# Patient Record
Sex: MALE | Race: WHITE | NOT HISPANIC OR LATINO | Employment: OTHER | URBAN - METROPOLITAN AREA
[De-identification: names, ages, dates, MRNs, and addresses within clinical notes are randomized per-mention and may not be internally consistent; named-entity substitution may affect disease eponyms.]

---

## 2022-11-23 ENCOUNTER — TRANSFERRED RECORDS (OUTPATIENT)
Dept: HEALTH INFORMATION MANAGEMENT | Facility: CLINIC | Age: 82
End: 2022-11-23

## 2023-01-18 ENCOUNTER — MEDICAL CORRESPONDENCE (OUTPATIENT)
Dept: HEALTH INFORMATION MANAGEMENT | Facility: CLINIC | Age: 83
End: 2023-01-18

## 2023-07-25 ENCOUNTER — HOSPITAL ENCOUNTER (OUTPATIENT)
Facility: CLINIC | Age: 83
End: 2023-07-25
Attending: ORTHOPAEDIC SURGERY | Admitting: ORTHOPAEDIC SURGERY
Payer: MEDICARE

## 2023-08-22 NOTE — PROGRESS NOTES
Pre-op Total Joint Patient Screening    Do you have a ride available to come to the hospital the day after your surgery by 8am with anticipated discharge of 11am? Y  What is the name of this person? Cayla(spouse)  Do you have a  set up after surgery? Y  Will your  be the same person that gives you a ride home after surgery? Y  Have you received the Joint Replacement Guidebook? Y  Do you have any questions about your guidebook? N  Have you signed up for Epic Care Companion? N  Have you signed up for MY Chart access? N

## 2023-08-23 RX ORDER — ACETAMINOPHEN 500 MG
1000 TABLET ORAL EVERY 8 HOURS PRN
Status: ON HOLD | COMMUNITY
Start: 2023-02-08 | End: 2023-08-26

## 2023-08-23 RX ORDER — TAMSULOSIN HYDROCHLORIDE 0.4 MG/1
0.4 CAPSULE ORAL DAILY
COMMUNITY

## 2023-08-23 RX ORDER — ATORVASTATIN CALCIUM 20 MG/1
20 TABLET, FILM COATED ORAL DAILY
COMMUNITY

## 2023-08-23 RX ORDER — ACYCLOVIR 400 MG/1
400 TABLET ORAL 2 TIMES DAILY PRN
COMMUNITY
Start: 2022-10-20

## 2023-08-24 RX ORDER — PREGABALIN 150 MG/1
1 CAPSULE ORAL 2 TIMES DAILY
COMMUNITY

## 2023-08-24 RX ORDER — ACETAZOLAMIDE 125 MG/1
1 TABLET ORAL 2 TIMES DAILY PRN
COMMUNITY

## 2023-08-25 ENCOUNTER — HOSPITAL ENCOUNTER (OUTPATIENT)
Facility: CLINIC | Age: 83
Discharge: HOME | End: 2023-08-26
Attending: ORTHOPAEDIC SURGERY | Admitting: ORTHOPAEDIC SURGERY
Payer: MEDICARE

## 2023-08-25 ENCOUNTER — APPOINTMENT (OUTPATIENT)
Dept: PHYSICAL THERAPY | Facility: CLINIC | Age: 83
End: 2023-08-25
Attending: ORTHOPAEDIC SURGERY
Payer: MEDICARE

## 2023-08-25 ENCOUNTER — APPOINTMENT (OUTPATIENT)
Dept: GENERAL RADIOLOGY | Facility: CLINIC | Age: 83
End: 2023-08-25
Payer: MEDICARE

## 2023-08-25 ENCOUNTER — ANESTHESIA EVENT (OUTPATIENT)
Dept: SURGERY | Facility: CLINIC | Age: 83
End: 2023-08-25
Payer: MEDICARE

## 2023-08-25 ENCOUNTER — ANESTHESIA (OUTPATIENT)
Dept: SURGERY | Facility: CLINIC | Age: 83
End: 2023-08-25
Payer: MEDICARE

## 2023-08-25 DIAGNOSIS — Z96.651 STATUS POST TOTAL RIGHT KNEE REPLACEMENT: Primary | ICD-10-CM

## 2023-08-25 LAB
CREAT SERPL-MCNC: 0.85 MG/DL (ref 0.67–1.17)
FASTING STATUS PATIENT QL REPORTED: YES
GFR SERPL CREATININE-BSD FRML MDRD: 86 ML/MIN/1.73M2
GLUCOSE SERPL-MCNC: 105 MG/DL (ref 70–99)
HGB BLD-MCNC: 12.7 G/DL (ref 13.3–17.7)
POTASSIUM SERPL-SCNC: 4.3 MMOL/L (ref 3.4–5.3)

## 2023-08-25 PROCEDURE — 360N000077 HC SURGERY LEVEL 4, PER MIN: Performed by: ORTHOPAEDIC SURGERY

## 2023-08-25 PROCEDURE — C1776 JOINT DEVICE (IMPLANTABLE): HCPCS | Performed by: ORTHOPAEDIC SURGERY

## 2023-08-25 PROCEDURE — 250N000011 HC RX IP 250 OP 636

## 2023-08-25 PROCEDURE — 710N000009 HC RECOVERY PHASE 1, LEVEL 1, PER MIN: Performed by: ORTHOPAEDIC SURGERY

## 2023-08-25 PROCEDURE — 999N000141 HC STATISTIC PRE-PROCEDURE NURSING ASSESSMENT: Performed by: ORTHOPAEDIC SURGERY

## 2023-08-25 PROCEDURE — 250N000011 HC RX IP 250 OP 636: Performed by: ANESTHESIOLOGY

## 2023-08-25 PROCEDURE — 258N000003 HC RX IP 258 OP 636: Performed by: NURSE ANESTHETIST, CERTIFIED REGISTERED

## 2023-08-25 PROCEDURE — 370N000017 HC ANESTHESIA TECHNICAL FEE, PER MIN: Performed by: ORTHOPAEDIC SURGERY

## 2023-08-25 PROCEDURE — 85018 HEMOGLOBIN: CPT

## 2023-08-25 PROCEDURE — 82565 ASSAY OF CREATININE: CPT

## 2023-08-25 PROCEDURE — 36415 COLL VENOUS BLD VENIPUNCTURE: CPT

## 2023-08-25 PROCEDURE — 250N000009 HC RX 250: Performed by: ORTHOPAEDIC SURGERY

## 2023-08-25 PROCEDURE — 999N000063 XR KNEE PORT RIGHT 1/2 VIEWS: Mod: RT

## 2023-08-25 PROCEDURE — 250N000011 HC RX IP 250 OP 636: Performed by: ORTHOPAEDIC SURGERY

## 2023-08-25 PROCEDURE — 99207 PR NO CHARGE LOS: CPT | Performed by: INTERNAL MEDICINE

## 2023-08-25 PROCEDURE — 258N000003 HC RX IP 258 OP 636: Performed by: STUDENT IN AN ORGANIZED HEALTH CARE EDUCATION/TRAINING PROGRAM

## 2023-08-25 PROCEDURE — C1713 ANCHOR/SCREW BN/BN,TIS/BN: HCPCS | Performed by: ORTHOPAEDIC SURGERY

## 2023-08-25 PROCEDURE — 272N000001 HC OR GENERAL SUPPLY STERILE: Performed by: ORTHOPAEDIC SURGERY

## 2023-08-25 PROCEDURE — 258N000003 HC RX IP 258 OP 636

## 2023-08-25 PROCEDURE — 258N000001 HC RX 258: Performed by: ORTHOPAEDIC SURGERY

## 2023-08-25 PROCEDURE — 250N000013 HC RX MED GY IP 250 OP 250 PS 637: Performed by: INTERNAL MEDICINE

## 2023-08-25 PROCEDURE — 250N000009 HC RX 250: Performed by: NURSE ANESTHETIST, CERTIFIED REGISTERED

## 2023-08-25 PROCEDURE — 82947 ASSAY GLUCOSE BLOOD QUANT: CPT | Performed by: STUDENT IN AN ORGANIZED HEALTH CARE EDUCATION/TRAINING PROGRAM

## 2023-08-25 PROCEDURE — 84132 ASSAY OF SERUM POTASSIUM: CPT | Performed by: STUDENT IN AN ORGANIZED HEALTH CARE EDUCATION/TRAINING PROGRAM

## 2023-08-25 PROCEDURE — 250N000013 HC RX MED GY IP 250 OP 250 PS 637

## 2023-08-25 PROCEDURE — 250N000011 HC RX IP 250 OP 636: Mod: JZ | Performed by: NURSE ANESTHETIST, CERTIFIED REGISTERED

## 2023-08-25 PROCEDURE — 97116 GAIT TRAINING THERAPY: CPT | Mod: GP

## 2023-08-25 PROCEDURE — 97161 PT EVAL LOW COMPLEX 20 MIN: CPT | Mod: GP

## 2023-08-25 PROCEDURE — 97110 THERAPEUTIC EXERCISES: CPT | Mod: GP

## 2023-08-25 PROCEDURE — 250N000009 HC RX 250: Performed by: ANESTHESIOLOGY

## 2023-08-25 DEVICE — UNIVERSAL TIBIAL BASEPLATE
Type: IMPLANTABLE DEVICE | Site: KNEE | Status: FUNCTIONAL
Brand: TRIATHLON

## 2023-08-25 DEVICE — CRUCIATE RETAINING FEMORAL
Type: IMPLANTABLE DEVICE | Site: KNEE | Status: FUNCTIONAL
Brand: TRIATHLON

## 2023-08-25 DEVICE — FULL DOSE BONE CEMENT, 10 PACK CATALOG NUMBER IS 6191-1-010
Type: IMPLANTABLE DEVICE | Site: KNEE | Status: FUNCTIONAL
Brand: SIMPLEX

## 2023-08-25 DEVICE — TIBIAL BEARING INSERT - CS
Type: IMPLANTABLE DEVICE | Site: KNEE | Status: FUNCTIONAL
Brand: TRIATHLON

## 2023-08-25 RX ORDER — NALOXONE HYDROCHLORIDE 0.4 MG/ML
0.2 INJECTION, SOLUTION INTRAMUSCULAR; INTRAVENOUS; SUBCUTANEOUS
Status: DISCONTINUED | OUTPATIENT
Start: 2023-08-25 | End: 2023-08-26 | Stop reason: HOSPADM

## 2023-08-25 RX ORDER — POLYETHYLENE GLYCOL 3350 17 G/17G
1 POWDER, FOR SOLUTION ORAL DAILY
Qty: 7 PACKET | Refills: 0 | Status: SHIPPED | OUTPATIENT
Start: 2023-08-25

## 2023-08-25 RX ORDER — OXYCODONE HYDROCHLORIDE 5 MG/1
10 TABLET ORAL EVERY 4 HOURS PRN
Status: DISCONTINUED | OUTPATIENT
Start: 2023-08-25 | End: 2023-08-26 | Stop reason: HOSPADM

## 2023-08-25 RX ORDER — FENTANYL CITRATE 0.05 MG/ML
25 INJECTION, SOLUTION INTRAMUSCULAR; INTRAVENOUS EVERY 5 MIN PRN
Status: DISCONTINUED | OUTPATIENT
Start: 2023-08-25 | End: 2023-08-25 | Stop reason: HOSPADM

## 2023-08-25 RX ORDER — LIDOCAINE HYDROCHLORIDE 20 MG/ML
INJECTION, SOLUTION INFILTRATION; PERINEURAL PRN
Status: DISCONTINUED | OUTPATIENT
Start: 2023-08-25 | End: 2023-08-25

## 2023-08-25 RX ORDER — PROCHLORPERAZINE MALEATE 5 MG
5 TABLET ORAL EVERY 6 HOURS PRN
Status: DISCONTINUED | OUTPATIENT
Start: 2023-08-25 | End: 2023-08-26 | Stop reason: HOSPADM

## 2023-08-25 RX ORDER — HYDROMORPHONE HCL IN WATER/PF 6 MG/30 ML
0.2 PATIENT CONTROLLED ANALGESIA SYRINGE INTRAVENOUS
Status: DISCONTINUED | OUTPATIENT
Start: 2023-08-25 | End: 2023-08-26 | Stop reason: HOSPADM

## 2023-08-25 RX ORDER — CEFAZOLIN SODIUM/WATER 2 G/20 ML
2 SYRINGE (ML) INTRAVENOUS SEE ADMIN INSTRUCTIONS
Status: DISCONTINUED | OUTPATIENT
Start: 2023-08-25 | End: 2023-08-25 | Stop reason: HOSPADM

## 2023-08-25 RX ORDER — AMOXICILLIN 250 MG
1-2 CAPSULE ORAL 2 TIMES DAILY
Qty: 30 TABLET | Refills: 0 | Status: SHIPPED | OUTPATIENT
Start: 2023-08-25

## 2023-08-25 RX ORDER — POLYETHYLENE GLYCOL 3350 17 G/17G
17 POWDER, FOR SOLUTION ORAL DAILY
Status: DISCONTINUED | OUTPATIENT
Start: 2023-08-26 | End: 2023-08-26 | Stop reason: HOSPADM

## 2023-08-25 RX ORDER — MAGNESIUM HYDROXIDE 1200 MG/15ML
LIQUID ORAL PRN
Status: DISCONTINUED | OUTPATIENT
Start: 2023-08-25 | End: 2023-08-25 | Stop reason: HOSPADM

## 2023-08-25 RX ORDER — TAMSULOSIN HYDROCHLORIDE 0.4 MG/1
0.4 CAPSULE ORAL DAILY
Status: DISCONTINUED | OUTPATIENT
Start: 2023-08-25 | End: 2023-08-26 | Stop reason: HOSPADM

## 2023-08-25 RX ORDER — ONDANSETRON 2 MG/ML
4 INJECTION INTRAMUSCULAR; INTRAVENOUS EVERY 30 MIN PRN
Status: DISCONTINUED | OUTPATIENT
Start: 2023-08-25 | End: 2023-08-25 | Stop reason: HOSPADM

## 2023-08-25 RX ORDER — NALOXONE HYDROCHLORIDE 0.4 MG/ML
0.4 INJECTION, SOLUTION INTRAMUSCULAR; INTRAVENOUS; SUBCUTANEOUS
Status: DISCONTINUED | OUTPATIENT
Start: 2023-08-25 | End: 2023-08-26 | Stop reason: HOSPADM

## 2023-08-25 RX ORDER — ONDANSETRON 4 MG/1
4 TABLET, ORALLY DISINTEGRATING ORAL EVERY 30 MIN PRN
Status: DISCONTINUED | OUTPATIENT
Start: 2023-08-25 | End: 2023-08-25 | Stop reason: HOSPADM

## 2023-08-25 RX ORDER — TRANEXAMIC ACID 650 MG/1
1950 TABLET ORAL ONCE
Status: COMPLETED | OUTPATIENT
Start: 2023-08-25 | End: 2023-08-25

## 2023-08-25 RX ORDER — LABETALOL HYDROCHLORIDE 5 MG/ML
10 INJECTION, SOLUTION INTRAVENOUS
Status: DISCONTINUED | OUTPATIENT
Start: 2023-08-25 | End: 2023-08-25 | Stop reason: HOSPADM

## 2023-08-25 RX ORDER — LIDOCAINE 40 MG/G
CREAM TOPICAL
Status: DISCONTINUED | OUTPATIENT
Start: 2023-08-25 | End: 2023-08-26 | Stop reason: HOSPADM

## 2023-08-25 RX ORDER — SODIUM CHLORIDE, SODIUM LACTATE, POTASSIUM CHLORIDE, CALCIUM CHLORIDE 600; 310; 30; 20 MG/100ML; MG/100ML; MG/100ML; MG/100ML
INJECTION, SOLUTION INTRAVENOUS CONTINUOUS PRN
Status: DISCONTINUED | OUTPATIENT
Start: 2023-08-25 | End: 2023-08-25

## 2023-08-25 RX ORDER — ACETAMINOPHEN 325 MG/1
975 TABLET ORAL EVERY 8 HOURS
Status: DISCONTINUED | OUTPATIENT
Start: 2023-08-25 | End: 2023-08-26 | Stop reason: HOSPADM

## 2023-08-25 RX ORDER — AMOXICILLIN 250 MG
1 CAPSULE ORAL 2 TIMES DAILY
Status: DISCONTINUED | OUTPATIENT
Start: 2023-08-25 | End: 2023-08-26 | Stop reason: HOSPADM

## 2023-08-25 RX ORDER — ASPIRIN 81 MG/1
81 TABLET ORAL 2 TIMES DAILY
Status: DISCONTINUED | OUTPATIENT
Start: 2023-08-25 | End: 2023-08-26 | Stop reason: HOSPADM

## 2023-08-25 RX ORDER — PANTOPRAZOLE SODIUM 40 MG/1
40 TABLET, DELAYED RELEASE ORAL 2 TIMES DAILY
Status: DISCONTINUED | OUTPATIENT
Start: 2023-08-25 | End: 2023-08-26 | Stop reason: HOSPADM

## 2023-08-25 RX ORDER — ONDANSETRON 2 MG/ML
4 INJECTION INTRAMUSCULAR; INTRAVENOUS EVERY 6 HOURS PRN
Status: DISCONTINUED | OUTPATIENT
Start: 2023-08-25 | End: 2023-08-26 | Stop reason: HOSPADM

## 2023-08-25 RX ORDER — PROPOFOL 10 MG/ML
INJECTION, EMULSION INTRAVENOUS CONTINUOUS PRN
Status: DISCONTINUED | OUTPATIENT
Start: 2023-08-25 | End: 2023-08-25

## 2023-08-25 RX ORDER — BISACODYL 10 MG
10 SUPPOSITORY, RECTAL RECTAL DAILY PRN
Status: DISCONTINUED | OUTPATIENT
Start: 2023-08-25 | End: 2023-08-26 | Stop reason: HOSPADM

## 2023-08-25 RX ORDER — OXYCODONE HYDROCHLORIDE 5 MG/1
5-10 TABLET ORAL EVERY 4 HOURS PRN
Qty: 30 TABLET | Refills: 0 | Status: SHIPPED | OUTPATIENT
Start: 2023-08-25

## 2023-08-25 RX ORDER — ASPIRIN 81 MG/1
81 TABLET ORAL 2 TIMES DAILY
Qty: 90 TABLET | Refills: 0 | Status: SHIPPED | OUTPATIENT
Start: 2023-08-25

## 2023-08-25 RX ORDER — CELECOXIB 200 MG/1
200 CAPSULE ORAL DAILY
Qty: 14 CAPSULE | Refills: 0 | Status: SHIPPED | OUTPATIENT
Start: 2023-08-25 | End: 2023-08-26

## 2023-08-25 RX ORDER — FENTANYL CITRATE 0.05 MG/ML
50 INJECTION, SOLUTION INTRAMUSCULAR; INTRAVENOUS EVERY 5 MIN PRN
Status: DISCONTINUED | OUTPATIENT
Start: 2023-08-25 | End: 2023-08-25 | Stop reason: HOSPADM

## 2023-08-25 RX ORDER — KETOROLAC TROMETHAMINE 15 MG/ML
15 INJECTION, SOLUTION INTRAMUSCULAR; INTRAVENOUS EVERY 6 HOURS
Status: COMPLETED | OUTPATIENT
Start: 2023-08-25 | End: 2023-08-26

## 2023-08-25 RX ORDER — CEFAZOLIN SODIUM/WATER 2 G/20 ML
2 SYRINGE (ML) INTRAVENOUS
Status: COMPLETED | OUTPATIENT
Start: 2023-08-25 | End: 2023-08-25

## 2023-08-25 RX ORDER — SODIUM CHLORIDE, SODIUM LACTATE, POTASSIUM CHLORIDE, CALCIUM CHLORIDE 600; 310; 30; 20 MG/100ML; MG/100ML; MG/100ML; MG/100ML
INJECTION, SOLUTION INTRAVENOUS CONTINUOUS
Status: DISCONTINUED | OUTPATIENT
Start: 2023-08-25 | End: 2023-08-25 | Stop reason: HOSPADM

## 2023-08-25 RX ORDER — VANCOMYCIN HYDROCHLORIDE 1 G/20ML
INJECTION, POWDER, LYOPHILIZED, FOR SOLUTION INTRAVENOUS PRN
Status: DISCONTINUED | OUTPATIENT
Start: 2023-08-25 | End: 2023-08-25 | Stop reason: HOSPADM

## 2023-08-25 RX ORDER — DEXAMETHASONE SODIUM PHOSPHATE 4 MG/ML
INJECTION, SOLUTION INTRA-ARTICULAR; INTRALESIONAL; INTRAMUSCULAR; INTRAVENOUS; SOFT TISSUE PRN
Status: DISCONTINUED | OUTPATIENT
Start: 2023-08-25 | End: 2023-08-25

## 2023-08-25 RX ORDER — ONDANSETRON 2 MG/ML
INJECTION INTRAMUSCULAR; INTRAVENOUS PRN
Status: DISCONTINUED | OUTPATIENT
Start: 2023-08-25 | End: 2023-08-25

## 2023-08-25 RX ORDER — ONDANSETRON 4 MG/1
4 TABLET, ORALLY DISINTEGRATING ORAL EVERY 6 HOURS PRN
Status: DISCONTINUED | OUTPATIENT
Start: 2023-08-25 | End: 2023-08-26 | Stop reason: HOSPADM

## 2023-08-25 RX ORDER — ATORVASTATIN CALCIUM 20 MG/1
20 TABLET, FILM COATED ORAL DAILY
Status: DISCONTINUED | OUTPATIENT
Start: 2023-08-26 | End: 2023-08-26 | Stop reason: HOSPADM

## 2023-08-25 RX ORDER — SODIUM CHLORIDE, SODIUM LACTATE, POTASSIUM CHLORIDE, CALCIUM CHLORIDE 600; 310; 30; 20 MG/100ML; MG/100ML; MG/100ML; MG/100ML
INJECTION, SOLUTION INTRAVENOUS CONTINUOUS
Status: DISCONTINUED | OUTPATIENT
Start: 2023-08-25 | End: 2023-08-26

## 2023-08-25 RX ORDER — HYDROMORPHONE HCL IN WATER/PF 6 MG/30 ML
0.4 PATIENT CONTROLLED ANALGESIA SYRINGE INTRAVENOUS EVERY 5 MIN PRN
Status: DISCONTINUED | OUTPATIENT
Start: 2023-08-25 | End: 2023-08-25 | Stop reason: HOSPADM

## 2023-08-25 RX ORDER — ACETAMINOPHEN 325 MG/1
650 TABLET ORAL EVERY 4 HOURS PRN
Status: DISCONTINUED | OUTPATIENT
Start: 2023-08-28 | End: 2023-08-26 | Stop reason: HOSPADM

## 2023-08-25 RX ORDER — OXYCODONE HYDROCHLORIDE 5 MG/1
5 TABLET ORAL EVERY 4 HOURS PRN
Status: DISCONTINUED | OUTPATIENT
Start: 2023-08-25 | End: 2023-08-26 | Stop reason: HOSPADM

## 2023-08-25 RX ORDER — HYDROMORPHONE HCL IN WATER/PF 6 MG/30 ML
0.4 PATIENT CONTROLLED ANALGESIA SYRINGE INTRAVENOUS
Status: DISCONTINUED | OUTPATIENT
Start: 2023-08-25 | End: 2023-08-26 | Stop reason: HOSPADM

## 2023-08-25 RX ORDER — ACETAMINOPHEN 325 MG/1
975 TABLET ORAL ONCE
Status: COMPLETED | OUTPATIENT
Start: 2023-08-25 | End: 2023-08-25

## 2023-08-25 RX ORDER — ACETAMINOPHEN 325 MG/1
650 TABLET ORAL EVERY 4 HOURS PRN
Qty: 100 TABLET | Refills: 0 | Status: SHIPPED | OUTPATIENT
Start: 2023-08-25 | End: 2023-08-26

## 2023-08-25 RX ORDER — HYDROMORPHONE HCL IN WATER/PF 6 MG/30 ML
0.2 PATIENT CONTROLLED ANALGESIA SYRINGE INTRAVENOUS EVERY 5 MIN PRN
Status: DISCONTINUED | OUTPATIENT
Start: 2023-08-25 | End: 2023-08-25 | Stop reason: HOSPADM

## 2023-08-25 RX ORDER — CEFAZOLIN SODIUM 1 G/3ML
1 INJECTION, POWDER, FOR SOLUTION INTRAMUSCULAR; INTRAVENOUS EVERY 8 HOURS
Status: COMPLETED | OUTPATIENT
Start: 2023-08-25 | End: 2023-08-26

## 2023-08-25 RX ORDER — FAMOTIDINE 20 MG/1
20 TABLET, FILM COATED ORAL 2 TIMES DAILY
Status: DISCONTINUED | OUTPATIENT
Start: 2023-08-25 | End: 2023-08-26 | Stop reason: HOSPADM

## 2023-08-25 RX ADMIN — TAMSULOSIN HYDROCHLORIDE 0.4 MG: 0.4 CAPSULE ORAL at 19:28

## 2023-08-25 RX ADMIN — ACETAMINOPHEN 975 MG: 325 TABLET, FILM COATED ORAL at 21:31

## 2023-08-25 RX ADMIN — KETOROLAC TROMETHAMINE 15 MG: 15 INJECTION, SOLUTION INTRAMUSCULAR; INTRAVENOUS at 21:33

## 2023-08-25 RX ADMIN — KETOROLAC TROMETHAMINE 15 MG: 15 INJECTION, SOLUTION INTRAMUSCULAR; INTRAVENOUS at 14:27

## 2023-08-25 RX ADMIN — PROPOFOL 300 MCG/KG/MIN: 10 INJECTION, EMULSION INTRAVENOUS at 10:04

## 2023-08-25 RX ADMIN — FENTANYL CITRATE 50 MCG: 50 INJECTION, SOLUTION INTRAMUSCULAR; INTRAVENOUS at 13:49

## 2023-08-25 RX ADMIN — PANTOPRAZOLE SODIUM 40 MG: 40 TABLET, DELAYED RELEASE ORAL at 21:33

## 2023-08-25 RX ADMIN — Medication 2 G: at 09:54

## 2023-08-25 RX ADMIN — MEPIVACAINE HYDROCHLORIDE 2.4 ML: 20 INJECTION, SOLUTION EPIDURAL; INFILTRATION at 10:00

## 2023-08-25 RX ADMIN — OXYCODONE HYDROCHLORIDE 5 MG: 5 TABLET ORAL at 17:26

## 2023-08-25 RX ADMIN — SODIUM CHLORIDE, POTASSIUM CHLORIDE, SODIUM LACTATE AND CALCIUM CHLORIDE: 600; 310; 30; 20 INJECTION, SOLUTION INTRAVENOUS at 17:32

## 2023-08-25 RX ADMIN — PHENYLEPHRINE HYDROCHLORIDE 100 MCG: 10 INJECTION INTRAVENOUS at 10:27

## 2023-08-25 RX ADMIN — CEFAZOLIN 1 G: 1 INJECTION, POWDER, FOR SOLUTION INTRAMUSCULAR; INTRAVENOUS at 17:26

## 2023-08-25 RX ADMIN — LIDOCAINE HYDROCHLORIDE 40 MG: 20 INJECTION, SOLUTION INFILTRATION; PERINEURAL at 10:04

## 2023-08-25 RX ADMIN — ROPIVACAINE HYDROCHLORIDE 15 ML: 5 INJECTION, SOLUTION EPIDURAL; INFILTRATION; PERINEURAL at 08:25

## 2023-08-25 RX ADMIN — OXYCODONE HYDROCHLORIDE 5 MG: 5 TABLET ORAL at 14:23

## 2023-08-25 RX ADMIN — FENTANYL CITRATE 50 MCG: 50 INJECTION, SOLUTION INTRAMUSCULAR; INTRAVENOUS at 14:33

## 2023-08-25 RX ADMIN — ASPIRIN 81 MG: 81 TABLET, COATED ORAL at 21:31

## 2023-08-25 RX ADMIN — TRANEXAMIC ACID 1950 MG: 650 TABLET ORAL at 07:23

## 2023-08-25 RX ADMIN — PREGABALIN 150 MG: 50 CAPSULE ORAL at 21:31

## 2023-08-25 RX ADMIN — PHENYLEPHRINE HYDROCHLORIDE 0.3 MCG/KG/MIN: 10 INJECTION INTRAVENOUS at 10:35

## 2023-08-25 RX ADMIN — DEXAMETHASONE SODIUM PHOSPHATE 10 MG: 4 INJECTION, SOLUTION INTRA-ARTICULAR; INTRALESIONAL; INTRAMUSCULAR; INTRAVENOUS; SOFT TISSUE at 10:09

## 2023-08-25 RX ADMIN — ACETAMINOPHEN 975 MG: 325 TABLET, FILM COATED ORAL at 07:23

## 2023-08-25 RX ADMIN — ONDANSETRON 4 MG: 2 INJECTION INTRAMUSCULAR; INTRAVENOUS at 11:47

## 2023-08-25 RX ADMIN — SODIUM CHLORIDE, POTASSIUM CHLORIDE, SODIUM LACTATE AND CALCIUM CHLORIDE: 600; 310; 30; 20 INJECTION, SOLUTION INTRAVENOUS at 09:54

## 2023-08-25 RX ADMIN — SENNOSIDES AND DOCUSATE SODIUM 1 TABLET: 50; 8.6 TABLET ORAL at 21:31

## 2023-08-25 RX ADMIN — FAMOTIDINE 20 MG: 20 TABLET, FILM COATED ORAL at 21:32

## 2023-08-25 RX ADMIN — PHENYLEPHRINE HYDROCHLORIDE 100 MCG: 10 INJECTION INTRAVENOUS at 11:48

## 2023-08-25 RX ADMIN — SODIUM CHLORIDE, POTASSIUM CHLORIDE, SODIUM LACTATE AND CALCIUM CHLORIDE: 600; 310; 30; 20 INJECTION, SOLUTION INTRAVENOUS at 08:14

## 2023-08-25 RX ADMIN — SODIUM CHLORIDE, POTASSIUM CHLORIDE, SODIUM LACTATE AND CALCIUM CHLORIDE: 600; 310; 30; 20 INJECTION, SOLUTION INTRAVENOUS at 12:12

## 2023-08-25 ASSESSMENT — ACTIVITIES OF DAILY LIVING (ADL)
ADLS_ACUITY_SCORE: 36
ADLS_ACUITY_SCORE: 36
ADLS_ACUITY_SCORE: 42
ADLS_ACUITY_SCORE: 33
ADLS_ACUITY_SCORE: 36
ADLS_ACUITY_SCORE: 42
ADLS_ACUITY_SCORE: 36
ADLS_ACUITY_SCORE: 36
ADLS_ACUITY_SCORE: 42

## 2023-08-25 NOTE — PROCEDURES
OPERATIVE REPORT - TKA    DATE OF SERVICE:  8/25/2023    ATTENDING: PRISCILA SHIELDS    SURGEON:  PRISCILA SHIELDS    ASSISTANT:    Celestine Long PA-C    PREOPERATIVE DIAGNOSIS:  Right knee osteoarthritis    POSTOPERATIVE DIAGNOSIS:   Same     PROCEDURES PERFORMED:   Right  Total Knee Arthroplasty.      ANESTHESIA:  1. Spinal  2. Adductor canal block    ESTIMATED BLOOD LOSS:   25 mL    TRANSFUSIONS:  none    FLUIDS:   Per anesthesia    SPECIMENS:  Arthroplasty resection materials.    DRAIN:  None    COMPLICATIONS:  None    TOURNIQUET TIME:  52 minutes at 220 mmHg    INDICATIONS:   The patient is a very pleasant 83 year old male who has had persistent complaints of knee pain for some time now. The pain interferes with activities of daily living including sitting, standing, and ambulating short distances. The physical examination showed a painful and limited range of motion of the right knee.  The x-ray showed bone-on-bone arthritis of the right knee.     The patient has tried nonoperative measures to control their pain including Tylenol, oral anti-inflammatories, activity modification, low impact exercises, assistive devices, injections, none of which have provided satisfactory pain relief. The patient desires joint replacement of the knee to improve their lifestyle and reduce their pain.      Preoperatively, the risks, benefits, and possible complications of the procedure were discussed. The risks discussed included but were not limited to wear, loosening, infection, neurovascular damage, thromboembolic disease, foot drop, limb length inequality, persistent pain, stiffness and dislocation. All of the questions were satisfactorily answered and the patient wished to proceed with joint replacement surgery to improve their lifestyle and reduce their pain.       IMPLANTS:  1. Femoral component:  Marci Triathlon CR Right size 5  2. Tibial component: Marci Triathlon [place] size 6   3. Poly insert:  [place]  Triathlon X3 CS 10 mm  4. Patella: not resurfaced  5. Bone cement:  Simplex    PROCEDURE  The patient was brought to the operating room after adequate induction of Anesthesia. The patient had a tourniquet placed on the thigh and the lower extremity were prepped and draped free in the usual sterile fashion using a Betadine scrub and ChloraPrep paint.    At this point a time-out procedure was carried out to identify the patient, the appropriate operative side, the implants, the code status, the fire risk, the preoperative medications and to confirm that the patient received 2 grams of ancef within one hour of the onset of the procedure.  Following completion of this, we proceeded with the case    The leg was elevated and exsanguinated, flexed to 90 degrees and the tourniquet was inflated to 220 mmHg. A midline incision was performed. This allowed creation of full thickness subcutaneous flaps.  A midvastus arthrotomy was used.  The meniscal tibial ligaments were released as feasible medially and laterally. The medial collateral ligament was subperiosteally elevated to the origin of the semimembranosus. The accessible anterior menisci were resected. The retropatellar fat pad was partially excised. The patella was dislocated into the lateral gutter and the knee was flexed to 90 degrees.     With the knee at 90 degrees flexion, the femoral intramedullary guide brad was placed and set for 5 degrees of valgus and impacted into position. The distal femoral resection was made.  The femur was sized to a 5, the 4-in-1 block was pinned in place with the appropriate amount of external rotation matching the transepicondylar axis and perpendicular to Freeborn's line.  The anterior posterior and chamfer osteotomies were completed. The residual osteophytes were removed from the periphery of the femur.     The tibia stabilized in an anterior subluxed position and neutral sagittal alignment. The intramedullary cutting apparatus was  placed down. We pinned the block in place, performed the proximal tibial resection using the oscillating saw and cutting block with 3 degrees of posterior inclination.  The tibial resection was checked with the intramedullary based checking apparatus and rasp. The tibia was sized to a 6. We put the tibial component in the appropriate amount of external rotation with the center between the middle and medial thirds of the tibial tuberosity. The proximal tibia was filled with morselized bone graft to permit cement extravasation.     The posterior osteophytes were resected using the curved half inch osteotome. The patella was everted. The patellar cartilage showed little to  no damage.  Any peripheral osteophytes were removed.  We went around the patella with the electrocautery and elected to no resurface the patella.    The trial components were placed. The leg came out to full extension and was nicely balanced with a 10 mm CS insert.  The knee was stable to varus and valgus stress in full extension. and mid flexion.  Our patellar tracking was checked.  The patella showed no tilt or subluxation and the patella engaging both condyles at 90 degrees. Satisfied with the patellar alignment, without any need for further releases, we removed the trial components. We prepared the bony surface for cementing.    The bony surfaces of the femur, tibia and patella were prepared for cementing.  Pulse irrigation was used on the femur, tibia and patella. The surfaces were then gauze dried. The femur, tibia and patella were cemented in place using vacuum mixed cement. Alignment, Range of Motion, Stability and Patellar tracking were appropriate.    The wound was closed in a layered fashion. The skin was brought together, everted and approximated with staples. Sterile dressings were applied. The patient was awakened and transported postanesthesia care in stable condition at the end of the case. Sponge and needle counts were correct.      Hemostasis was obtained throughout the procedure and prior to the closure of each layer using electrocautery. Pulsatile irrigation and dilute betadine irrigation were used throughout the procedure. Surgical Body Exhaust Systems and high exchange air flow were used during the procedure. The patient received 2 grams of ancef prior to tourniquet inflation and within 1 hours of the start of the procedure for antibiotic prophylaxis.    POSTOPERATIVE PLAN  1. WBAT on the right lower extremity   2. Antibiotic Prophylaxis were given 2 grams of ancef. Antibiotics were given within 1 hour of surgical incision and discontinued within 24 hours.  3. Pain control with Oxycodone, Tylenol and Celebrex  4. Follow up with Dr. West in 10-14 days. [phone].   5.  DVT prophylaxis aspirin and sequential compression devices will be started within 24 hours of surgery.  6. Plan discharge when meeting therapy goals and patient is medically stable  7. Caution with NSAID usage.    Frank West MD  [facility]  [phone]  [phone]  Primary Care Physician Philippe Barros

## 2023-08-25 NOTE — ANESTHESIA PROCEDURE NOTES
"Intrathecal Procedure Note    Pre-Procedure   Staff -        Anesthesiologist:  Dontrell Johnson MD       Performed By: anesthesiologist       Location: OR       Pre-Anesthestic Checklist: patient identified, IV checked, risks and benefits discussed, informed consent, monitors and equipment checked and pre-op evaluation  Timeout:       Correct Patient: Yes        Correct Procedure: Yes        Correct Site: Yes        Correct Position: Yes   Procedure Documentation  Procedure: intrathecal       Patient Position: sitting       Skin prep: Betadine       Insertion Site: L4-5. (midline approach).       Needle Gauge: 24.        Needle Length (Inches): 3.5        Spinal Needle Type: Pencan       Introducer used       Introducer: 20 G       # of attempts: 1 and  # of redirects:     Assessment/Narrative         Paresthesias: No.       CSF fluid: clear.    Medication(s) Administered   2% Mepivacaine PF (Intrathecal) - Intrathecal   2.4 mL - 8/25/2023 10:00:00 AM   Comments:  1% lidocaine for localization.  No complications.        FOR [facility] ([facility] ONLY:   Pain Team Contact information: please page the Pain Team Via Meez. Search \"Pain\". During daytime hours, please page the attending first. At night please page the resident first.      "

## 2023-08-25 NOTE — ANESTHESIA POSTPROCEDURE EVALUATION
Patient: Jon H Billigmeier    Procedure: Procedure(s):  Right total knee arthroplasty       Anesthesia Type:  Spinal    Note:  Disposition: Admission   Postop Pain Control: Uneventful            Sign Out: Well controlled pain   PONV: No   Neuro/Psych: Uneventful            Sign Out: Acceptable/Baseline neuro status   Airway/Respiratory: Uneventful            Sign Out: Acceptable/Baseline resp. status   CV/Hemodynamics: Uneventful            Sign Out: Acceptable CV status; No obvious hypovolemia; No obvious fluid overload   Other NRE: NONE   DID A NON-ROUTINE EVENT OCCUR? No           Last vitals:  Vitals Value Taken Time   /78 08/25/23 1430   Temp 36.2  C (97.2  F) 08/25/23 1320   Pulse 64 08/25/23 1430   Resp 9 08/25/23 1414   SpO2 97 % 08/25/23 1437   Vitals shown include unvalidated device data.    Electronically Signed By: YOUSUF TRINH MD  August 25, 2023  2:39 PM

## 2023-08-25 NOTE — PROGRESS NOTES
"For home med recconciliation and comorbidity management    Jon H Billigmeier is a 83 year old who is s/p right total knee arthroplasty under spinal with adductor canal block, EBL 25 cc, no complications by Dr. West on 08/25/23     /78 (BP Location: Right arm)   Pulse 88   Temp 97.8  F (36.6  C) (Axillary)   Resp 16   Ht 1.727 m (5' 8\")   Wt 77.8 kg (171 lb 8 oz)   SpO2 93%   BMI 26.08 kg/m      K4.3, CR 0.85, glucose 105, hemoglobin 12.7 ,pre-op    Past medical history  BPH  GERD  Hyperlipidemia  Occipital neuralgia  History of HSV infection  Per Epic Hx of cerebral venous thrombosis (no further details and not on anticoagulation)  History of cerebral angiogram with embolization for aneurysm in 2008  History of meningioma, status post resection in 2008    PTA medications  Acetaminophen  Diamox as needed  Acyclovir as needed  Atorvastatin  Omeprazole  Flomax    Reconciled medications  Resumed atorvastatin, omeprazole, Lyrica, Flomax      Hemoglobin and BMP ordered for the morning    We will see and follow patient starting on 8/26/23. Please contact me by Vazquez if questions before 9 PM, after 9 PM please call on-call Hospitalist.         "

## 2023-08-25 NOTE — PROGRESS NOTES
PTA medications completed by Medication Scribe day of surgery     Medication history sources: Patient, H&P, and Patient's home med list  In the past week, patient estimated taking medication this percent of the time: Greater than 90%      Significant changes made to the medication list:  None      Additional medication history information:   None    Medication reconciliation completed by provider prior to medication history? No    Time spent in this activity: 30    The information provided in this note is only as accurate as the sources available at the time of update(s)    Prior to Admission medications    Medication Sig Last Dose Taking? Auth Provider Long Term End Date   acetaminophen (TYLENOL) 500 MG tablet Take 1,000 mg by mouth every 8 hours as needed for pain 8/24/2023 at prn Yes Reported, Patient     acetaZOLAMIDE (DIAMOX) 125 MG tablet Take 1 tablet by mouth 2 times daily as needed (for high altitude) Unknown at prn Yes Reported, Patient Yes    acyclovir (ZOVIRAX) 400 MG tablet Take 400 mg by mouth 2 times daily as needed (for outbreak) Unknown at prn Yes Reported, Patient Yes    atorvastatin (LIPITOR) 20 MG tablet Take 20 mg by mouth daily 8/24/2023 at am Yes Reported, Patient Yes    omeprazole (PRILOSEC) 20 MG DR capsule Take 20 mg by mouth 2 times daily 8/24/2023 at pm Yes Reported, Patient     pregabalin (LYRICA) 150 MG capsule Take 1 capsule by mouth 2 times daily 8/25/2023 at am Yes Reported, Patient Yes    Psyllium (METAMUCIL PO) Take 1 packet by mouth as needed Unknown at prn Yes Reported, Patient     tamsulosin (FLOMAX) 0.4 MG capsule Take 0.4 mg by mouth daily After a meal 8/24/2023 at pm Yes Reported, Patient         Medication history completed by:    Jordan Llanes CPhT  Medication Scribe  [facility]

## 2023-08-25 NOTE — PROGRESS NOTES
[facility]  OUTPATIENT PHYSICAL THERAPY EVALUATION  PLAN OF TREATMENT FOR OUTPATIENT REHABILITATION  (COMPLETE FOR INITIAL CLAIMS ONLY)  Patient's Last Name, First Name, M.I.  YOB: 1940  Billigmeier,Jon H                        Provider's Name  [facility] Medical Record No.  [id number]                             Onset Date:  08/25/23   Start of Care Date:  08/25/23   Type:     _X_PT   ___OT   ___SLP Medical Diagnosis:  TKA              PT Diagnosis:  impaired IND with functional mobility Visits from SOC:  1     See note for plan of treatment, functional goals and certification details    I CERTIFY THE NEED FOR THESE SERVICES FURNISHED UNDER        THIS PLAN OF TREATMENT AND WHILE UNDER MY CARE     (Physician co-signature of this document indicates review and certification of the therapy plan).              08/25/23 1700   Appointment Info   Signing Clinician's Name / Credentials (PT) Priyanka Severino DPT   Living Environment   People in Home spouse   Current Living Arrangements house   Home Accessibility stairs within home;stairs to enter home   Number of Stairs, Main Entrance 1   Stair Railings, Main Entrance none   Number of Stairs, Within Home, Primary greater than 10 stairs  (13)   Stair Railings, Within Home, Primary railing on right side (ascending)   Transportation Anticipated family or friend will provide   Living Environment Comments Pt lives with spouse who can assist at discharge   Self-Care   Usual Activity Tolerance good   Current Activity Tolerance moderate   Equipment Currently Used at Home walker, standard;commode chair;shower chair   Fall history within last six months no   Activity/Exercise/Self-Care Comment Pt IND with mobility and ADLs at baseline. Has borrowed a FWW and commode chair from a friend   General Information   Onset of Illness/Injury or Date of Surgery 08/25/23   Referring Physician Imelda Barron,  PA-C   Patient/Family Therapy Goals Statement (PT) to return home   Pertinent History of Current Problem (include personal factors and/or comorbidities that impact the POC) POD 0 R TKA   Existing Precautions/Restrictions fall   Weight-Bearing Status - RLE weight-bearing as tolerated   Cognition   Affect/Mental Status (Cognition) WFL   Orientation Status (Cognition) oriented x 3   Follows Commands (Cognition) WFL   Pain Assessment   Patient Currently in Pain Yes, see Vital Sign flowsheet  (6-7/10 in L knee)   Integumentary/Edema   Integumentary/Edema Comments edemawear to LLE   Posture    Posture Forward head position;Protracted shoulders   Range of Motion (ROM)   ROM Comment L knee 0-75 degrees, all other extremities WFL   Strength (Manual Muscle Testing)   Strength (Manual Muscle Testing) Able to perform R SLR;Able to perform L SLR;Deficits observed during functional mobility   Strength Comments decreased LLE functional strength 2/2 surgery   Bed Mobility   Comment, (Bed Mobility) NT, pt up in room with RN and NA upon PT arrival   Transfers   Comment, (Transfers) CGA for sit>stands at FWW   Gait/Stairs (Locomotion)   Distance in Feet (Gait) 10 + 30   Comment, (Gait/Stairs) Pt amb 5 ft with FWW and CGA, slow pace, step-to pattern   Balance   Balance Comments benefits from use of FWW for upright mobility   Sensory Examination   Sensory Perception WFL   Coordination   Coordination no deficits were identified   Muscle Tone   Muscle Tone no deficits were identified   Clinical Impression   Criteria for Skilled Therapeutic Intervention Yes, treatment indicated   PT Diagnosis (PT) impaired IND with functional mobility   Influenced by the following impairments impaired strength, ROM, balance, activity tolerance   Functional limitations due to impairments impaired bed mobility, transfers, ambulation   Clinical Presentation (PT Evaluation Complexity) Stable/Uncomplicated   Clinical Presentation Rationale Based on current  presentation, PMH, social support   Clinical Decision Making (Complexity) low complexity   Planned Therapy Interventions (PT) balance training;bed mobility training;gait training;home exercise program;patient/family education;ROM (range of motion);stair training;strengthening;transfer training;progressive activity/exercise;home program guidelines   Risk & Benefits of therapy have been explained evaluation/treatment results reviewed;care plan/treatment goals reviewed;risks/benefits reviewed;current/potential barriers reviewed;participants voiced agreement with care plan;participants included;patient   PT Total Evaluation Time   PT Eval, Low Complexity Minutes (13388) 10   Physical Therapy Goals   PT Frequency 2x/day   PT Predicted Duration/Target Date for Goal Attainment 09/01/23   PT Goals Bed Mobility;Transfers;Gait;Stairs   PT: Bed Mobility Supervision/stand-by assist;Supine to/from sit   PT: Transfers Supervision/stand-by assist;Sit to/from stand;Bed to/from chair;Assistive device   PT: Gait Supervision/stand-by assist;Rolling walker;150 feet   PT: Stairs Supervision/stand-by assist;Rail on right;Greater than 10 stairs  (13)   Interventions   Interventions Quick Adds Gait Training;Therapeutic Activity;Therapeutic Procedure   Therapeutic Procedure/Exercise   Ther. Procedure: strength, endurance, ROM, flexibillity Minutes (85709) 10   Symptoms Noted During/After Treatment increased pain   Treatment Detail/Skilled Intervention Pt cued for LE therex for strength and ROM benefits: APs, QS, GS, heel slides, SAQ, SLR. 10 reps each, tolerated well.   Therapeutic Activity   Therapeutic Activities: dynamic activities to improve functional performance Minutes (25418) 5   Symptoms Noted During/After Treatment Fatigue;Increased pain   Treatment Detail/Skilled Intervention Edu provided on importance of knee extension at rest, use of FWW, mobility after surgery. Pt completed further sit<>stands with CGA at FWW. Cued for safe  hand placement. VSS throughout session. Remained in chair, alarm armed and needs in reach.   Gait Training   Gait Training Minutes (47285) 10   Symptoms Noted During/After Treatment (Gait Training) fatigue;increased pain   Treatment Detail/Skilled Intervention Pt amb in room with NA and RN upon PT arrival, hand-off to PT for further mobility. Amb 10 + 30 ft with CGA at FWW. Slow pace and decreased step length, steady. Pt cued for decreased UE reliance on FWW and continuous gait pattern. Declines further ambulation following.   PT Discharge Planning   PT Plan progress gait, sit<>stand reps, stair trial, therex   PT Discharge Recommendation (DC Rec)   (defer to ortho)   PT Rationale for DC Rec Pt below IND baseline, currently CGA with FWW for in room mobility. Pt lives with spouse who can assist at discharge, and is borrowing a FWW from a friend.   PT Brief overview of current status Ax1 FWW   Total Session Time   Timed Code Treatment Minutes 25   Total Session Time (sum of timed and untimed services) 35

## 2023-08-25 NOTE — ANESTHESIA PROCEDURE NOTES
Adductor canal Procedure Note    Pre-Procedure   Staff -        Anesthesiologist:  Dontrell Johnson MD       Performed By: Anesthesiologist       Location: pre-op       Pre-Anesthestic Checklist: patient identified, IV checked, site marked, risks and benefits discussed, informed consent, monitors and equipment checked, at physician/surgeon's request and post-op pain management  Timeout:       Correct Patient: Yes        Correct Procedure: Yes        Correct Site: Yes        Correct Position: Yes        Correct Laterality: Yes        Site Marked: Yes  Procedure Documentation  Procedure: Adductor canal       Laterality: right       Patient Position: supine       Patient Prep/Sterile Barriers: sterile gloves, mask       Skin prep: Chloraprep       Local skin infiltrated with mL of 1% lidocaine.        Needle Type: insulated and short bevel (Arrow)       Needle Gauge: 21.        Needle Length (millimeters): 90        Ultrasound guided       1. Ultrasound was used to identify targeted nerve, plexus, vascular marker, or fascial plane and place a needle adjacent to it in real-time.       2. Ultrasound was used to visualize the spread of anesthetic in close proximity to the above referenced structure.       3. A permanent image is entered into the patient's record.       4. The visualized anatomic structures appeared normal.       5. There were no apparent abnormal pathologic findings.    Assessment/Narrative         The placement was negative for: blood aspirated, painful injection and site bleeding       Paresthesias: No.       Bolus given via needle..        Secured via.        Insertion/Infusion Method: Single Shot       Complications: none    Medication(s) Administered   Ropivacaine 0.5% w/ 1:400K Epi (Injection) - Injection   15 mL - 8/25/2023 8:25:00 AM   Comments:  Peripheral nerve block performed at request of the primary medical team.      Postoperative pain block requested by surgeon for severe postoperative  "pain.  Patient brought to Preop for procedure.      Pt tolerated well.    No complications.      The surgeon has given a verbal order transferring care of this patient to me for the performance of a regional analgesia block for post-op pain control. It is requested of me because I am uniquely trained and qualified to perform this block and the surgeon is neither trained nor qualified to perform this procedure.    YOUSUF TRINH MD   August 25, 2023 8:35 AM         FOR [facility] ([facility] [place]) ONLY:   Pain Team Contact information: please page the Pain Team Via Monitor. Search \"Pain\". During daytime hours, please page the attending first. At night please page the resident first.      "

## 2023-08-25 NOTE — ANESTHESIA PREPROCEDURE EVALUATION
Anesthesia Pre-Procedure Evaluation    Patient: Jon H Billigmeier   MRN: [id number] : 1940        Procedure : Procedure(s):  Right total knee arthroplasty          Past Medical History:   Diagnosis Date    Arthritis     Arthropathy of cervical facet joint     Benign neoplasm of brain (H)     BPH (benign prostatic hyperplasia)     Cerebral venous thrombosis     Cervical spondylosis     Chronic pain syndrome     Degenerative joint disease of knee     DJD (degenerative joint disease) of knee     Right knee    Gastroesophageal reflux disease     HSV (herpes simplex virus) infection     Hx of removal of testicle     Right teste    Hypercholesteremia     Hyperlipidemia LDL goal <130     Imbalance     Insomnia     Meningioma (H)     Occipital neuralgia of right side     Primary atypical pneumonia     Rotator cuff tear     Bilateral    TGA (transient global amnesia)       Past Surgical History:   Procedure Laterality Date    BRAIN TUMOR RESECTION  2008    benign     CEREBRAL ANEURYSM REPAIR  2008    HCHG clip aneursym    CEREBRAL ANGIOGRAM  2008    with embolization    COLONOSCOPY  2013    Procedure: COLONOSCOPY;  COLONOSCOPY;  Surgeon: Vinny Llanes MD;  Location: [facility]    CRANIOTOMY Right 2008    Right parietal/occipital craniotomy and meningioma resection    HERNIA REPAIR        No Known Allergies   Social History     Tobacco Use    Smoking status: Former     Types: Cigarettes     Quit date: 1979     Years since quittin.6    Smokeless tobacco: Never   Substance Use Topics    Alcohol use: Yes     Comment: 2 to 3 a day       Wt Readings from Last 1 Encounters:   No data found for Wt        Anesthesia Evaluation            ROS/MED HX  ENT/Pulmonary:    (-) sleep apnea   Neurologic: Comment: Hx meningioma with resection; Occipital neuralgia of right side; Cerebral venous thrombosis;      Cardiovascular:     (+) Dyslipidemia - -   -  - -                                       METS/Exercise Tolerance:     Hematologic:       Musculoskeletal:   (+)  arthritis,             GI/Hepatic:     (+) GERD, Asymptomatic on medication,                  Renal/Genitourinary:     (+)        BPH,      Endo:       Psychiatric/Substance Use: Comment: TGA (transient global amnesia)      Infectious Disease:       Malignancy:       Other:            Physical Exam    Airway        Mallampati: III   TM distance: > 3 FB   Neck ROM: full   Mouth opening: > 3 cm    Respiratory Devices and Support         Dental       (+) Minor Abnormalities - some fillings, tiny chips      Cardiovascular   cardiovascular exam normal          Pulmonary   pulmonary exam normal                OUTSIDE LABS:  CBC: No results found for: WBC, HGB, HCT, PLT  BMP: No results found for: NA, POTASSIUM, CHLORIDE, CO2, BUN, CR, GLC  COAGS: No results found for: PTT, INR, FIBR  POC: No results found for: BGM, HCG, HCGS  HEPATIC: No results found for: ALBUMIN, PROTTOTAL, ALT, AST, GGT, ALKPHOS, BILITOTAL, BILIDIRECT, PRASHANTH  OTHER: No results found for: PH, LACT, A1C, JAE, PHOS, MAG, LIPASE, AMYLASE, TSH, T4, T3, CRP, SED    Anesthesia Plan    ASA Status:  2    NPO Status:  NPO Appropriate    Anesthesia Type: Spinal.              Consents    Anesthesia Plan(s) and associated risks, benefits, and realistic alternatives discussed. Questions answered and patient/representative(s) expressed understanding.     - Discussed:     - Discussed with:  Patient            Postoperative Care    Pain management: IV analgesics, Peripheral nerve block (Single Shot).   PONV prophylaxis: Ondansetron (or other 5HT-3)     Comments:                YOUSUF TRINH MD

## 2023-08-26 ENCOUNTER — APPOINTMENT (OUTPATIENT)
Dept: OCCUPATIONAL THERAPY | Facility: CLINIC | Age: 83
End: 2023-08-26
Attending: ORTHOPAEDIC SURGERY
Payer: MEDICARE

## 2023-08-26 ENCOUNTER — APPOINTMENT (OUTPATIENT)
Dept: PHYSICAL THERAPY | Facility: CLINIC | Age: 83
End: 2023-08-26
Attending: ORTHOPAEDIC SURGERY
Payer: MEDICARE

## 2023-08-26 VITALS
HEART RATE: 85 BPM | TEMPERATURE: 97.8 F | BODY MASS INDEX: 25.99 KG/M2 | SYSTOLIC BLOOD PRESSURE: 99 MMHG | WEIGHT: 171.5 LBS | OXYGEN SATURATION: 94 % | DIASTOLIC BLOOD PRESSURE: 54 MMHG | RESPIRATION RATE: 16 BRPM | HEIGHT: 68 IN

## 2023-08-26 LAB
ALBUMIN SERPL BCG-MCNC: 4 G/DL (ref 3.5–5.2)
ALP SERPL-CCNC: 55 U/L (ref 40–129)
ALT SERPL W P-5'-P-CCNC: 21 U/L (ref 0–70)
ANION GAP SERPL CALCULATED.3IONS-SCNC: 13 MMOL/L (ref 7–15)
AST SERPL W P-5'-P-CCNC: 29 U/L (ref 0–45)
BILIRUB DIRECT SERPL-MCNC: <0.2 MG/DL (ref 0–0.3)
BILIRUB SERPL-MCNC: 0.4 MG/DL
BUN SERPL-MCNC: 20.2 MG/DL (ref 8–23)
CALCIUM SERPL-MCNC: 9.6 MG/DL (ref 8.8–10.2)
CHLORIDE SERPL-SCNC: 101 MMOL/L (ref 98–107)
CREAT SERPL-MCNC: 0.89 MG/DL (ref 0.67–1.17)
DEPRECATED HCO3 PLAS-SCNC: 21 MMOL/L (ref 22–29)
GFR SERPL CREATININE-BSD FRML MDRD: 85 ML/MIN/1.73M2
GLUCOSE SERPL-MCNC: 119 MG/DL (ref 70–99)
HGB BLD-MCNC: 12.4 G/DL (ref 13.3–17.7)
POTASSIUM SERPL-SCNC: 4.1 MMOL/L (ref 3.4–5.3)
PROT SERPL-MCNC: 6.6 G/DL (ref 6.4–8.3)
SODIUM SERPL-SCNC: 135 MMOL/L (ref 136–145)

## 2023-08-26 PROCEDURE — 97116 GAIT TRAINING THERAPY: CPT | Mod: GP

## 2023-08-26 PROCEDURE — 36415 COLL VENOUS BLD VENIPUNCTURE: CPT | Performed by: INTERNAL MEDICINE

## 2023-08-26 PROCEDURE — 82248 BILIRUBIN DIRECT: CPT | Performed by: HOSPITALIST

## 2023-08-26 PROCEDURE — 97165 OT EVAL LOW COMPLEX 30 MIN: CPT | Mod: GO

## 2023-08-26 PROCEDURE — 80053 COMPREHEN METABOLIC PANEL: CPT | Performed by: HOSPITALIST

## 2023-08-26 PROCEDURE — 250N000013 HC RX MED GY IP 250 OP 250 PS 637: Performed by: INTERNAL MEDICINE

## 2023-08-26 PROCEDURE — 97535 SELF CARE MNGMENT TRAINING: CPT | Mod: GO

## 2023-08-26 PROCEDURE — 97530 THERAPEUTIC ACTIVITIES: CPT | Mod: GP

## 2023-08-26 PROCEDURE — 85018 HEMOGLOBIN: CPT

## 2023-08-26 PROCEDURE — 99203 OFFICE O/P NEW LOW 30 MIN: CPT | Performed by: HOSPITALIST

## 2023-08-26 PROCEDURE — 250N000013 HC RX MED GY IP 250 OP 250 PS 637

## 2023-08-26 PROCEDURE — 250N000011 HC RX IP 250 OP 636: Mod: JZ

## 2023-08-26 RX ORDER — CELECOXIB 100 MG/1
100 CAPSULE ORAL 2 TIMES DAILY
Qty: 28 CAPSULE | Refills: 0 | Status: SHIPPED | OUTPATIENT
Start: 2023-08-26

## 2023-08-26 RX ORDER — ACETAMINOPHEN 500 MG
1000 TABLET ORAL EVERY 6 HOURS PRN
Qty: 100 TABLET | Refills: 0 | Status: SHIPPED | OUTPATIENT
Start: 2023-08-26

## 2023-08-26 RX ADMIN — SENNOSIDES AND DOCUSATE SODIUM 1 TABLET: 50; 8.6 TABLET ORAL at 09:49

## 2023-08-26 RX ADMIN — PANTOPRAZOLE SODIUM 40 MG: 40 TABLET, DELAYED RELEASE ORAL at 09:49

## 2023-08-26 RX ADMIN — ASPIRIN 81 MG: 81 TABLET, COATED ORAL at 09:49

## 2023-08-26 RX ADMIN — POLYETHYLENE GLYCOL 3350 17 G: 17 POWDER, FOR SOLUTION ORAL at 09:48

## 2023-08-26 RX ADMIN — ACETAMINOPHEN 975 MG: 325 TABLET, FILM COATED ORAL at 06:00

## 2023-08-26 RX ADMIN — ATORVASTATIN CALCIUM 20 MG: 20 TABLET, FILM COATED ORAL at 09:48

## 2023-08-26 RX ADMIN — FAMOTIDINE 20 MG: 20 TABLET, FILM COATED ORAL at 09:48

## 2023-08-26 RX ADMIN — KETOROLAC TROMETHAMINE 15 MG: 15 INJECTION, SOLUTION INTRAMUSCULAR; INTRAVENOUS at 09:49

## 2023-08-26 RX ADMIN — CEFAZOLIN 1 G: 1 INJECTION, POWDER, FOR SOLUTION INTRAMUSCULAR; INTRAVENOUS at 02:47

## 2023-08-26 RX ADMIN — KETOROLAC TROMETHAMINE 15 MG: 15 INJECTION, SOLUTION INTRAMUSCULAR; INTRAVENOUS at 03:28

## 2023-08-26 RX ADMIN — PREGABALIN 150 MG: 50 CAPSULE ORAL at 09:48

## 2023-08-26 RX ADMIN — TAMSULOSIN HYDROCHLORIDE 0.4 MG: 0.4 CAPSULE ORAL at 09:49

## 2023-08-26 ASSESSMENT — ACTIVITIES OF DAILY LIVING (ADL)
ADLS_ACUITY_SCORE: 43
PREVIOUS_RESPONSIBILITIES: MEAL PREP;HOUSEKEEPING;LAUNDRY;MEDICATION MANAGEMENT;FINANCES;DRIVING
ADLS_ACUITY_SCORE: 43

## 2023-08-26 NOTE — PLAN OF CARE
Goal Outcome Evaluation:    Patient vital signs are at baseline: Yes  Patient able to ambulate as they were prior to admission or with assist devices provided by therapies during their stay:  Yes  Patient MUST void prior to discharge:  Yes  Patient able to tolerate oral intake:  Yes  Pain has adequate pain control using Oral analgesics:  Yes  Does patient have an identified :  Yes  Has goal D/C date and time been discussed with patient:  Yes     A&O x 4, has scattered bruises on both UE, IV SL, assist x1 to get up with gait belt and walker, uses urinal. dressing dry in clean,discharge to be determine, management continue.

## 2023-08-26 NOTE — CONSULTS
[facility]    Consult note   Hospitalist    Jon H Billigmeier MRN# [id number]   Age: 83 year old YOB: 1940     Date of Admission:  8/25/2023    Primary care provider: Philippe Barros          Assessment and Plan:     Jon H Billigmeier is a 83 year old  male with medical history of arthritis, cervical spondylolysis, degenerative joint disease of knee, hyperlipidemia, GERD, BPH, occipital neuralgia, cerebral venous thrombosis not on anticoagulation, history of meningioma status postresection in 2008 admitted on 8/25/2023 for elective knee surgery.  Hospitalist team consulted for medical comanagement, postop medication management.    Status post right total knee arthroplasty on 8/25/2023 for degenerative joint disease.  Defer postoperative care including DVT prophylaxis, pain managed to surgical team.  On pharmacological DVT prophylaxis with aspirin twice daily and on celecoxib.  Recommended to PTA omeprazole.  Monitor for signs and symptoms of bleeding  Rehabilitation, follow-up per primary team.  Aggressive incentive spirometry.  Bowel meds as needed to prevent constipation.  Minimize narcotic use as able to.    Acute anemia.  Likely from surgical blood loss, dilutional  Baseline hemoglobin between 13-14.  Postop hemoglobin 12.4.  Monitor hemoglobin level in 10 to 14 days  Follow up with primary care provider, Philippe Barros, within 10-14 days for hospital follow- up.      BPH  Continue PTA tamsulosin.    GERD  Continue PTA omeprazole.    Chronic pain.  Occipital neuralgia  Continue PTA Lyrica.      Hyperlipidemia  Continue PTA statin therapy    History of HSV infection  No acute issues.    Per Epic Hx of cerebral venous thrombosis (no further details and not on anticoagulation)  History of cerebral angiogram with embolization for aneurysm in 2008  Noted   On aspirin twice daily for DVT prophylaxis as above.      History of meningioma, status post resection in 2008  Noted    DVT  Prophylaxis: SCDs, pharmacological DVT prophylaxis per surgical team  Code Status: Full Code    Disposition: Expected discharge today per surgical team.  Total time greater than 35 minutes.  More than 70% of time spent in direct patient care, care coordination, patient counseling, and formalizing plan of care.   Discussed with patient, his wife by the bedside and bedside RN.    James Garcia MD          Chief Complaint:     History is obtained from the patient, wife at the bedside and chart review.    Jon H Billigmeier is a 83 year old  male with medical history of arthritis, cervical spondylolysis, degenerative joint disease of knee, hyperlipidemia, GERD, BPH, occipital neuralgia, cerebral venous thrombosis not on anticoagulation, history of meningioma status postresection in 2008 admitted on 8/25/2023 for elective knee surgery.    Hospitalist team consulted for medical comanagement, postop medication management.  Patient lying in bed.  Denies any chest pain or palpitations.  Denies any nausea or vomiting.  Denies any new tingling or numbness.  Denies any headache or dizziness.  Denies any abdominal pain.  Denies any difficulty with urination.  Has not had bowel movement yet.  Ambulated with therapy.  Reports plan for discharge this afternoon.  No bleeding or oozing at this time.  On aspirin twice daily for DVT prophylaxis          Review of Systems:     GENERAL: No fever  EENT: No new vision change  PULMONARY: No shortness of breath,   CARDIAC: no chest pain  GI: No abdominal pain, nausea, vomiting  : No burning/pain with urination  NEURO: No significant headaches  SKIN: No skin rashes  PSYCHIATRY no anxiety    Medical History:     Past Medical History:   Diagnosis Date    Arthritis     Arthropathy of cervical facet joint     Benign neoplasm of brain (H)     BPH (benign prostatic hyperplasia)     Cerebral venous thrombosis     Cervical spondylosis     Chronic pain syndrome     Degenerative joint disease of  knee     DJD (degenerative joint disease) of knee     Right knee    Gastroesophageal reflux disease     HSV (herpes simplex virus) infection     Hx of removal of testicle     Right teste    Hypercholesteremia     Hyperlipidemia LDL goal <130     Imbalance     Insomnia     Meningioma (H)     Occipital neuralgia of right side     Primary atypical pneumonia     Rotator cuff tear     Bilateral    TGA (transient global amnesia)         Surgical History:      Past Surgical History:   Procedure Laterality Date    BRAIN TUMOR RESECTION  2008    benign     CEREBRAL ANEURYSM REPAIR  2008    HCHG clip aneursym    CEREBRAL ANGIOGRAM  2008    with embolization    COLONOSCOPY  2013    Procedure: COLONOSCOPY;  COLONOSCOPY;  Surgeon: Vinny Llanes MD;  Location: [facility]    CRANIOTOMY Right 2008    Right parietal/occipital craniotomy and meningioma resection    HERNIA REPAIR               Social History:      Social History     Tobacco Use    Smoking status: Former     Types: Cigarettes     Quit date: 1979     Years since quittin.6    Smokeless tobacco: Never   Substance Use Topics    Alcohol use: Yes     Comment: 2 to 3 a day              Family History:   History reviewed. No pertinent family history.          Allergies:   No Known Allergies          Medications:     Medications Prior to Admission   Medication Sig Dispense Refill Last Dose    acetaZOLAMIDE (DIAMOX) 125 MG tablet Take 1 tablet by mouth 2 times daily as needed (for high altitude)   Unknown at prn    acyclovir (ZOVIRAX) 400 MG tablet Take 400 mg by mouth 2 times daily as needed (for outbreak)   Unknown at prn    atorvastatin (LIPITOR) 20 MG tablet Take 20 mg by mouth daily   2023 at am    omeprazole (PRILOSEC) 20 MG DR capsule Take 20 mg by mouth 2 times daily   2023 at pm    pregabalin (LYRICA) 150 MG capsule Take 1 capsule by mouth 2 times daily   2023 at am    Psyllium (METAMUCIL PO) Take 1 packet by  mouth as needed   Unknown at prn    tamsulosin (FLOMAX) 0.4 MG capsule Take 0.4 mg by mouth daily After a meal   8/24/2023 at pm    [DISCONTINUED] acetaminophen (TYLENOL) 500 MG tablet Take 1,000 mg by mouth every 8 hours as needed for pain   8/24/2023 at prn             Physical Exam      Admission Weight: 77.8 kg (171 lb 8 oz)    Vital Signs with Ranges  Temp:  [97.2  F (36.2  C)-98.4  F (36.9  C)] 97.8  F (36.6  C)  Pulse:  [63-88] 85  Resp:  [10-21] 16  BP: ()/(51-93) 99/54  SpO2:  [90 %-99 %] 94 %    Intake/Output Summary (Last 24 hours) at 8/26/2023 1136  Last data filed at 8/25/2023 2058  Gross per 24 hour   Intake 1440 ml   Output 200 ml   Net 1240 ml       PHYSICAL EXAM  GENERAL: Patient is in no distress. Alert and oriented.  HEENT: Oropharynx pink  HEART: Regular rate and rhythm. S1S2. No murmurs  LUNGS: Clear to auscultation bilaterally. No expiratory wheeze.  Respirations unlabored  ABDOMEN: Soft, no abdominal tenderness, bowel sounds heard   NEURO: Moving all extremities   EXTREMITIES: No pedal edema.  Surgical dressing intact  SKIN: Warm, dry. + bruising.  PSYCHIATRY Cooperative         Data:   All new lab and imaging data was reviewed.

## 2023-08-26 NOTE — PROGRESS NOTES
08/26/23 0745   Appointment Info   Signing Clinician's Name / Credentials (OT) Delio Espino OTR/L   Quick Adds   Quick Adds Certification   Living Environment   People in Home spouse   Current Living Arrangements house   Home Accessibility stairs within home;stairs to enter home   Number of Stairs, Main Entrance 1   Stair Railings, Main Entrance none   Number of Stairs, Within Home, Primary greater than 10 stairs  (13)   Stair Railings, Within Home, Primary railing on right side (ascending)   Transportation Anticipated family or friend will provide   Living Environment Comments Pt lives w/ spouse in house. Bed/bath on main. Walk in shower.   Self-Care   Usual Activity Tolerance good   Current Activity Tolerance moderate   Equipment Currently Used at Home walker, standard;commode chair;shower chair   Fall history within last six months no   Activity/Exercise/Self-Care Comment Pt reports being IND w/ all ADL's at baseline prior to admission.   Instrumental Activities of Daily Living (IADL)   Previous Responsibilities meal prep;housekeeping;laundry;medication management;finances;driving   IADL Comments Pt reports being IND w/ all IADL's a baseline prior to admission. Pt does still drive.   General Information   Onset of Illness/Injury or Date of Surgery 08/25/23   Referring Physician Imelda Barron PA-C   Patient/Family Therapy Goal Statement (OT) Return to home.   Additional Occupational Profile Info/Pertinent History of Current Problem POD #1 R TKA   Existing Precautions/Restrictions fall;weight bearing   Right Lower Extremity (Weight-bearing Status) weight-bearing as tolerated (WBAT)   Cognitive Status Examination   Orientation Status orientation to person, place and time   Visual Perception   Visual Impairment/Limitations corrective lenses for reading   Sensory   Sensory Quick Adds sensation intact   Pain Assessment   Patient Currently in Pain No   Range of Motion Comprehensive   General Range of Motion no  range of motion deficits identified   Strength Comprehensive (MMT)   Comment, General Manual Muscle Testing (MMT) Assessment Generalized weakness bilaterally   Bed Mobility   Bed Mobility supine-sit   Supine-Sit Dickson (Bed Mobility) supervision   Transfers   Transfers sit-stand transfer;toilet transfer   Sit-Stand Transfer   Assistive Device (Sit-Stand Transfers) walker, front-wheeled   Sit/Stand Transfer Comments SBA   Toilet Transfer   Type (Toilet Transfer) stand-sit;sit-stand   Assistive Device (Toilet Transfer) walker, front-wheeled   Toilet Transfer Comments SBA   Activities of Daily Living   BADL Assessment/Intervention upper body dressing;lower body dressing;grooming;toileting   Upper Body Dressing Assessment/Training   Dickson Level (Upper Body Dressing) independent   Lower Body Dressing Assessment/Training   Position (Lower Body Dressing) unsupported sitting   Dickson Level (Lower Body Dressing) supervision   Grooming Assessment/Training   Position (Grooming) sink side   Dickson Level (Grooming) supervision   Toileting   Dickson Level (Toileting) supervision   Clinical Impression   Criteria for Skilled Therapeutic Interventions Met (OT) Yes, treatment indicated   OT Diagnosis R TKA   Influenced by the following impairments Decreased ADL independence and tolerance   OT Problem List-Impairments impacting ADL problems related to;activity tolerance impaired;pain   Assessment of Occupational Performance 1-3 Performance Deficits   Identified Performance Deficits LB dressing, bathing, toilet transfer   Planned Therapy Interventions (OT) ADL retraining;IADL retraining;home program guidelines;progressive activity/exercise;risk factor education   Clinical Decision Making Complexity (OT) low complexity   Risk & Benefits of therapy have been explained evaluation/treatment results reviewed;care plan/treatment goals reviewed;risks/benefits reviewed;current/potential barriers reviewed;patient    OT Total Evaluation Time   OT Eval, Low Complexity Minutes (20276) 10   Therapy Certification   Medical Diagnosis R TKA   Start of Care Date 08/26/23   Certification date from 08/26/23   Certification date to 08/26/23   OT Goals   Therapy Frequency (OT) One time eval and treatment   OT Predicted Duration/Target Date for Goal Attainment 08/26/23   OT Goals Hygiene/Grooming;Lower Body Dressing;Toilet Transfer/Toileting   OT: Hygiene/Grooming supervision/stand-by assist;while standing;Goal Met   OT: Lower Body Dressing Supervision/stand-by assist;including set-up/clothing retrieval;Goal Met   OT: Toilet Transfer/Toileting Supervision/stand-by assist;toilet transfer;cleaning and garment management;Goal Met   Self-Care/Home Management   Self-Care/Home Mgmt/ADL, Compensatory, Meal Prep Minutes (06951) 18   Symptoms Noted During/After Treatment (Meal Preparation/Planning Training) fatigue   Treatment Detail/Skilled Intervention OT: Pt greeted supine in bed and agreeable for OT evaluation. Supervision sup > sit edge of bed w/ head of bed elevated. Pt completed sit > stand transfer w/ SBA and use of FWW; VC's for safe hand placement. Pt ambulated within room to bathroom and completed toilet transfer w/ SBA; pt able to void w/ supervision. Following completion, pt stood sinkside to complete 1 g/h task. Pt returned to room and seated in bedside chair. Pt educated regarding safe technique w/ LB dressing; pt able to complete supervision. IND to complete UB dressing. Additional time spent at end of therapy session discussing home safety. Pt remained seated in chair at end of therapy session w/ all needs met and family present in room.   OT Discharge Planning   OT Plan Discharge OT   OT Discharge Recommendation (DC Rec)   (Defer to Ortho)   OT Rationale for DC Rec Pt is currently functioning below baseline as expected following R TKA. Pt reports living at home w/ spouse and being IND w/ all ADL's and mobility at baseline.  Anticipate pt will be safe to d/c home w/ assist from spouse as needed.   OT Brief overview of current status Supervision LB dressing, SBA transfers w/ FWW   Total Session Time   Timed Code Treatment Minutes 18   Total Session Time (sum of timed and untimed services) 28      [facility]  OUTPATIENT OCCUPATIONAL THERAPY  EVALUATION  PLAN OF TREATMENT FOR OUTPATIENT REHABILITATION  (COMPLETE FOR INITIAL CLAIMS ONLY)  Patient's Last Name, First Name, M.I.  YOB: 1940  Billigmeier,Jon H                          Provider's Name  [facility] Medical Record No.  [id number]                             Onset Date:  08/25/23   Start of Care Date:  08/26/23   Type:     ___PT   _X_OT   ___SLP Medical Diagnosis:  R TKA                    OT Diagnosis:  R TKA Visits from SOC:  1     See note for plan of treatment, functional goals and certification details    I CERTIFY THE NEED FOR THESE SERVICES FURNISHED UNDER        THIS PLAN OF TREATMENT AND WHILE UNDER MY CARE     (Physician co-signature of this document indicates review and certification of the therapy plan).

## 2023-08-26 NOTE — PLAN OF CARE
Patient is A&O x4. Vss on RA. Up with SBA of 1 with gait belt and walker. Dressing CDI. Pain controlled with tylenol. Iv access removed. Discharge education completed, all questions answered. Personal belongings returned.

## 2023-08-26 NOTE — PROGRESS NOTES
The patient was seen and examined by Dr. West, below represents his examination and plan.     Orthopedic Surgery  Jon H Billigmeier  2023  Admit Date:  2023  POD 1 Day Post-Op  S/P Procedure(s):  Right total knee arthroplasty    Patient is feeling well.  Pain controlled.  Tolerating oral intake.  No events overnight. Discussed pain and recovery expectations with patient and spouse.  Discharge instructions reviewed.     Alert and orient to person, place, and time.  Vital Sign Ranges  Temperature Temp  Av.7  F (36.5  C)  Min: 97.2  F (36.2  C)  Max: 98.4  F (36.9  C)   Blood pressure Systolic (24hrs), Av , Min:92 , Max:148        Diastolic (24hrs), Av, Min:51, Max:93      Pulse Pulse  Av.7  Min: 63  Max: 88   Respirations Resp  Av  Min: 10  Max: 21   Pulse oximetry SpO2  Av %  Min: 90 %  Max: 99 %       Right knee edema wear is clean, dry, and intact. Minimal erythema of the surrounding skin.  Bilateral calves are soft, non-tender.  right lower extremity is NVI.    Labs:  Recent Labs   Lab Test 23  0743   POTASSIUM 4.3     Recent Labs   Lab Test 23  0743   HGB 12.7*     No results for input(s): INR in the last 57905 hours.  No results for input(s): PLT in the last 04531 hours.    A/P  1. S/p right TKA   Continue aspirin 81mg bid for DVT prophylaxis.     Mobilize with PT/OT WBAT.     Continue current pain regimen.    2. Disposition   Anticipate d/c to home today with homecare.    Kjerstin L Foss, PA-C

## 2023-08-26 NOTE — PROGRESS NOTES
"Physical Therapy Discharge Summary    Reason for therapy discharge:    Discharged to home.    Progress towards therapy goal(s). See goals on Care Plan in [facility] electronic health record for goal details.  Adequate  for safe discharge home with wife and their rolling walker.    Therapy recommendation(s):    Continued therapy is recommended.  Rationale/Recommendations:  per below.       08/26/23 5786   Appointment Info   Signing Clinician's Name / Credentials (PT) Brittany Dressler, PT   Gait/Stairs (Locomotion)   Distance in Feet (Gait) ' RW modified step-through with light UE WB, steady.   Therapeutic Procedure/Exercise   Ther. Procedure: strength, endurance, ROM, flexibillity Minutes (05242) 2   Treatment Detail/Skilled Intervention Affected knee ext 0deg, flx 115deg. Reviewed HEP and discussed maintaining ROM.   Therapeutic Activity   Therapeutic Activities: dynamic activities to improve functional performance Minutes (26214) 8   Symptoms Noted During/After Treatment None   Treatment Detail/Skilled Intervention Pt agreeable to PT: SBA sit-stand training initially for hand palcement with good teach back > 75% of the time, wife able to cue, B pivots supervision RW safe technique noted. Finished in chair alarm armed. Edu on car tx technique.   Gait Training   Gait Training Minutes (77837) 20   Symptoms Noted During/After Treatment (Gait Training) fatigue;shortness of breath;increased pain  (to tolerance)   Treatment Detail/Skilled Intervention Stairs 2 then 1 rail 12 total with SBA safety spotting and heavy sequencing cues.   Seated break.    Platform step with RW for home entry with sequencing cue most successful \"cut leg is always lower.\" Pt struggling to remember sequencing, wife understanding and able to help, PT trained wife on caregiver positioning as well.   PT Discharge Planning   PT Plan Acute PT needs met.   PT Discharge Recommendation (DC Rec)   (per ortho)   PT Rationale for DC Rec Pt appropriate to " return home with wife's support and OP ortho PT per ortho doc.   PT Brief overview of current status Supervision RW, Ax1 on stairs for sequencing.   Total Session Time   Timed Code Treatment Minutes 30   Total Session Time (sum of timed and untimed services) 30

## 2023-08-26 NOTE — PROGRESS NOTES
Summary: POD# 0 Right TKA  Orientation/Cognitive: A/OX4.   Mobility Level/Assist Equipment:A1/G/W  Fall Risk (Y/N): Y  Behavior Concerns: None  Pain Management: Liliane Lyrica and PRN oxy  Tele/VS/O2: VSS on RA. Capno in use  ABNL Lab/BG: Hgb 12.7  Diet: Reg. Good appetite  Bowel/Bladder: Cont  Skin Concerns: R kne surgical incision  Drains/Devices: PIV infusing IVF as ordered.   Tests/Procedures for next shift: None  Anticipated DC date & active delays: Pending Ortho rec

## 2023-08-26 NOTE — PLAN OF CARE
Occupational Therapy Discharge Summary    Reason for therapy discharge:    All goals and outcomes met, no further needs identified.    Progress towards therapy goal(s). See goals on Care Plan in [facility] electronic health record for goal details.  Goals met    Therapy recommendation(s):    Pt is currently functioning below baseline as expected following R TKA. Pt reports living at home w/ spouse and being IND w/ all ADL's and mobility at baseline. Anticipate pt will be safe to d/c home w/ assist from spouse as needed.

## 2023-09-05 ENCOUNTER — DOCUMENTATION ONLY (OUTPATIENT)
Dept: OTHER | Facility: CLINIC | Age: 83
End: 2023-09-05
Payer: MEDICARE

## 2023-12-04 ENCOUNTER — MEDICAL CORRESPONDENCE (OUTPATIENT)
Dept: HEALTH INFORMATION MANAGEMENT | Facility: CLINIC | Age: 83
End: 2023-12-04
Payer: MEDICARE

## (undated) DEVICE — LINEN TOWEL PACK X5 5464

## (undated) DEVICE — SOL NACL 0.9% IRRIG 1000ML BOTTLE 2F7124

## (undated) DEVICE — SOLUTION WOUND CLEANSING 3/4OZ 10% PVP EA-L3011FB-50

## (undated) DEVICE — DRSG STERI STRIP 1/2X4" R1547

## (undated) DEVICE — DRSG AQUACEL AG HYDROFIBER  3.5X10" 422605

## (undated) DEVICE — GLOVE BIOGEL PI MICRO SZ 7.0 48570

## (undated) DEVICE — GOWN IMPERVIOUS SPECIALTY XL/XLONG 39049

## (undated) DEVICE — DRSG KERLIX FLUFFS X5

## (undated) DEVICE — ESU GROUND PAD UNIVERSAL W/O CORD

## (undated) DEVICE — PACK TOTAL KNEE SOP15TKFSD

## (undated) DEVICE — NDL 21GA 1.5"

## (undated) DEVICE — GOWN XXLG REINFORCED 9071EL

## (undated) DEVICE — BLADE SAW SAGITTAL STRK 18X90X1.27MM HD SYS 6 6118-127-090

## (undated) DEVICE — STPL SKIN 35W 6.9MM  PXW35

## (undated) DEVICE — DRAPE STERI U 1015

## (undated) DEVICE — SOL NACL 0.9% IRRIG 3000ML BAG 2B7477

## (undated) DEVICE — MANIFOLD NEPTUNE 4 PORT 700-20

## (undated) DEVICE — DRAPE IOBAN INCISE 23X17" 6650EZ

## (undated) DEVICE — BONE CEMENT MIXEVAC HI VAC W/CARTRIDGE 0306-563-000

## (undated) DEVICE — GLOVE BIOGEL PI SZ 7.5 40875

## (undated) DEVICE — GLOVE BIOGEL PI SZ 6.5 40865

## (undated) DEVICE — GLOVE BIOGEL PI MICRO SZ 8.0 48580

## (undated) DEVICE — DRAPE STERI TOWEL LG 1010

## (undated) DEVICE — CAST PADDING 6" UNSTERILE 9046

## (undated) DEVICE — CAST PADDING 6" STERILE 9046S

## (undated) DEVICE — GLOVE BIOGEL PI MICRO INDICATOR UNDERGLOVE SZ 7.0 48970

## (undated) DEVICE — DRSG ADAPTIC 3X8" 6113

## (undated) DEVICE — SUCTION IRR SYSTEM W/O TIP INTERPULSE HANDPIECE 0210-100-000

## (undated) DEVICE — SYR 30ML LL W/O NDL 302832

## (undated) DEVICE — WRAP EZY KNEE 1213PP

## (undated) DEVICE — BLADE SAW SAGITTAL STRK 31X63X0.64MM HD 4/2000 2108-140-006

## (undated) DEVICE — BLADE KNIFE SURG 11 371111

## (undated) DEVICE — SU VICRYL 2-0 CT 36" J957H

## (undated) DEVICE — SU VICRYL 1 CTX CR 8X18" J765D

## (undated) DEVICE — BLADE SAW SAGITTAL THK1.13 MM L90 MM X W18 MM 4118-127-090

## (undated) DEVICE — SOL WATER IRRIG 1000ML BOTTLE 2F7114

## (undated) DEVICE — DECANTER BAG 2002S

## (undated) DEVICE — CAST PADDING 4" COTTON WEBRIL UNSTERILE 9084

## (undated) DEVICE — GLOVE BIOGEL PI MICRO INDICATOR UNDERGLOVE SZ 8.0 48980

## (undated) DEVICE — HOOD SURG T7PLUS PEEL AWAY FACE SHIELD STRL LF 0416-801-100

## (undated) DEVICE — BONE CLEANING TIP INTERPULSE  0210-010-000

## (undated) DEVICE — DRSG AQUACEL AG 3.5X9.75" HYDROFIBER 412011

## (undated) RX ORDER — TRANEXAMIC ACID 650 MG/1
TABLET ORAL
Status: DISPENSED
Start: 2023-08-25

## (undated) RX ORDER — PROPOFOL 10 MG/ML
INJECTION, EMULSION INTRAVENOUS
Status: DISPENSED
Start: 2023-08-25

## (undated) RX ORDER — FENTANYL CITRATE 0.05 MG/ML
INJECTION, SOLUTION INTRAMUSCULAR; INTRAVENOUS
Status: DISPENSED
Start: 2023-08-25

## (undated) RX ORDER — ACETAMINOPHEN 325 MG/1
TABLET ORAL
Status: DISPENSED
Start: 2023-08-25

## (undated) RX ORDER — FENTANYL CITRATE 50 UG/ML
INJECTION, SOLUTION INTRAMUSCULAR; INTRAVENOUS
Status: DISPENSED
Start: 2023-08-25

## (undated) RX ORDER — OXYCODONE HYDROCHLORIDE 5 MG/1
TABLET ORAL
Status: DISPENSED
Start: 2023-08-25

## (undated) RX ORDER — CEFAZOLIN SODIUM/WATER 2 G/20 ML
SYRINGE (ML) INTRAVENOUS
Status: DISPENSED
Start: 2023-08-25

## (undated) RX ORDER — KETOROLAC TROMETHAMINE 15 MG/ML
INJECTION, SOLUTION INTRAMUSCULAR; INTRAVENOUS
Status: DISPENSED
Start: 2023-08-25

## (undated) RX ORDER — VANCOMYCIN HYDROCHLORIDE 1 G/20ML
INJECTION, POWDER, LYOPHILIZED, FOR SOLUTION INTRAVENOUS
Status: DISPENSED
Start: 2023-08-25